# Patient Record
Sex: FEMALE | Race: WHITE | URBAN - METROPOLITAN AREA
[De-identification: names, ages, dates, MRNs, and addresses within clinical notes are randomized per-mention and may not be internally consistent; named-entity substitution may affect disease eponyms.]

---

## 2023-05-13 ENCOUNTER — NURSE ONLY (OUTPATIENT)
Dept: URGENT CARE | Age: 73
End: 2023-05-13

## 2023-05-13 VITALS
DIASTOLIC BLOOD PRESSURE: 87 MMHG | SYSTOLIC BLOOD PRESSURE: 147 MMHG | BODY MASS INDEX: 43.02 KG/M2 | RESPIRATION RATE: 17 BRPM | OXYGEN SATURATION: 92 % | HEIGHT: 64 IN | WEIGHT: 252 LBS | TEMPERATURE: 98.6 F | HEART RATE: 80 BPM

## 2023-05-13 DIAGNOSIS — J20.9 ACUTE BRONCHITIS, UNSPECIFIED ORGANISM: Primary | ICD-10-CM

## 2023-05-13 DIAGNOSIS — I10 ESSENTIAL HYPERTENSION: ICD-10-CM

## 2023-05-13 DIAGNOSIS — R05.1 ACUTE COUGH: ICD-10-CM

## 2023-05-13 LAB
Lab: NORMAL
PERFORMING INSTRUMENT: NORMAL
QC PASS/FAIL: NORMAL
SARS-COV-2, POC: NORMAL

## 2023-05-13 RX ORDER — DEXTROMETHORPHAN HYDROBROMIDE AND PROMETHAZINE HYDROCHLORIDE 15; 6.25 MG/5ML; MG/5ML
2.5 SYRUP ORAL NIGHTLY PRN
Qty: 30 ML | Refills: 0 | Status: SHIPPED | OUTPATIENT
Start: 2023-05-13

## 2023-05-13 RX ORDER — ATORVASTATIN CALCIUM 20 MG/1
20 TABLET, FILM COATED ORAL DAILY
COMMUNITY
Start: 2023-01-23

## 2023-05-13 RX ORDER — GUAIFENESIN 600 MG/1
600 TABLET, EXTENDED RELEASE ORAL 2 TIMES DAILY
Qty: 30 TABLET | Refills: 0 | Status: SHIPPED | OUTPATIENT
Start: 2023-05-13 | End: 2023-05-28

## 2023-05-13 RX ORDER — GARLIC EXTRACT 500 MG
1 CAPSULE ORAL DAILY
COMMUNITY
Start: 2017-03-17

## 2023-05-13 RX ORDER — MAGNESIUM OXIDE 400 MG/1
400 TABLET ORAL DAILY
COMMUNITY

## 2023-05-13 RX ORDER — LEVOTHYROXINE SODIUM 0.07 MG/1
TABLET ORAL
COMMUNITY
Start: 2023-05-09

## 2023-05-13 RX ORDER — METFORMIN HYDROCHLORIDE 500 MG/1
1 TABLET, EXTENDED RELEASE ORAL
COMMUNITY
Start: 2023-04-21

## 2023-05-13 RX ORDER — BENZONATATE 200 MG/1
200 CAPSULE ORAL 3 TIMES DAILY PRN
Qty: 60 CAPSULE | Refills: 0 | Status: SHIPPED | OUTPATIENT
Start: 2023-05-13 | End: 2023-06-02

## 2023-05-13 RX ORDER — UBIDECARENONE 30 MG
CAPSULE ORAL
COMMUNITY

## 2023-05-13 RX ORDER — METHYLPREDNISOLONE 4 MG/1
TABLET ORAL
Qty: 1 KIT | Refills: 0 | Status: SHIPPED | OUTPATIENT
Start: 2023-05-13 | End: 2023-05-19

## 2023-05-13 ASSESSMENT — ENCOUNTER SYMPTOMS
COUGH: 1
SHORTNESS OF BREATH: 1

## 2023-05-13 NOTE — PATIENT INSTRUCTIONS
Take medicaton as prescribed. Reviewed increasing water intake, sleeping in an elevated position to aid post nasal drip, using a cool mist humidifier,covering cough and proper hand hygiene. Go to Er for worsening shortness of breath  Negative for COVID19 today. Follow up with your primary care provider in 7 days if symptoms persist or if symptoms worsen. New Prescriptions    BENZONATATE (TESSALON) 200 MG CAPSULE    Take 1 capsule by mouth 3 times daily as needed for Cough    GUAIFENESIN (MUCINEX) 600 MG EXTENDED RELEASE TABLET    Take 1 tablet by mouth 2 times daily for 15 days    METHYLPREDNISOLONE (MEDROL DOSEPACK) 4 MG TABLET    Take by mouth.     PROMETHAZINE-DEXTROMETHORPHAN (PROMETHAZINE-DM) 6.25-15 MG/5ML SYRUP    Take 2.5 mLs by mouth nightly as needed for Cough

## 2023-05-13 NOTE — PROGRESS NOTES
Mathew Watts (:  1950) is a 68 y.o. female,New patient, here for evaluation of the following chief complaint(s):  Cough, Congestion, and Shortness of Breath (Coughing wheezing for the past couple months, two days of crackles and throat mucus )      ASSESSMENT/PLAN:    ICD-10-CM    1. Acute bronchitis, unspecified organism  J20.9 methylPREDNISolone (MEDROL DOSEPACK) 4 MG tablet     benzonatate (TESSALON) 200 MG capsule     promethazine-dextromethorphan (PROMETHAZINE-DM) 6.25-15 MG/5ML syrup     guaiFENesin (MUCINEX) 600 MG extended release tablet      2. Acute cough  R05.1 POCT COVID-19, Antigen     benzonatate (TESSALON) 200 MG capsule     promethazine-dextromethorphan (PROMETHAZINE-DM) 6.25-15 MG/5ML syrup     guaiFENesin (MUCINEX) 600 MG extended release tablet      3. Essential hypertension  I10            Take medicaton as prescribed. Reviewed increasing water intake, sleeping in an elevated position to aid post nasal drip, using a cool mist humidifier,covering cough and proper hand hygiene. Go to Er for worsening shortness of breath  Negative for COVID19 today. Follow up with your primary care provider in 7 days if symptoms persist or if symptoms worsen. SUBJECTIVE/OBJECTIVE:    History provided by:  Patient   used: No    HPI:   68 y.o. female presents with symptoms of wheezing with coughing ongoing since 1 month ago had some mucus now in the last 2 days. Denies fever. Had a positive COVID19 test at home but it was . Patient would like a confirmation test today. Vitals:    23 1134   BP: (!) 147/87   Pulse: 80   Resp: 17   Temp: 98.6 °F (37 °C)   SpO2: 92%   Weight: 252 lb (114.3 kg)   Height: 5' 4\" (1.626 m)       Review of Systems   HENT:  Positive for congestion. Respiratory:  Positive for cough and shortness of breath. All other systems reviewed and are negative. Physical Exam  Vitals and nursing note reviewed.    Constitutional:       Appearance:

## 2024-01-25 LAB
ALBUMIN SERPL-MCNC: 4 G/DL (ref 3.5–5.7)
ANION GAP SERPL CALCULATED.3IONS-SCNC: 5 MMOL/L (ref 4–16)
BUN BLDV-MCNC: 10 MG/DL (ref 8–26)
CALCIUM SERPL-MCNC: 8.7 MG/DL (ref 8.5–10.4)
CHLORIDE BLD-SCNC: 105 MEQ/L (ref 98–111)
CO2: 31 MMOL/L (ref 21–31)
CREAT SERPL-MCNC: 0.79 MG/DL (ref 0.6–1.2)
EGFR (CKD-EPI): 79 ML/MIN/1.73 M2
ESTIMATED AVERAGE GLUCOSE: 123 MG/DL
FRUCTOSAMINE: 225 MCMOL/L (ref 205–285)
GLUCOSE BLD-MCNC: 90 MG/DL (ref 70–99)
HBA1C MFR BLD: 5.9 % (ref 4.2–5.6)
HCT VFR BLD CALC: 42.8 % (ref 36–46)
HEMOGLOBIN: 14.3 G/DL (ref 12–15.2)
MCH RBC QN AUTO: 31.1 PG (ref 27–33)
MCHC RBC AUTO-ENTMCNC: 33.5 G/DL (ref 32–36)
MCV RBC AUTO: 93 FL (ref 82–97)
PDW BLD-RTO: 13.8 % (ref 12.3–17)
PHOSPHORUS: 2.8 MG/DL (ref 2.5–4.5)
PLATELET # BLD: 157 THOU/MCL (ref 140–375)
PMV BLD AUTO: 10.2 FL (ref 7.4–11.5)
POTASSIUM SERPL-SCNC: 3.9 MEQ/L (ref 3.6–5.1)
RBC # BLD: 4.6 MIL/MCL (ref 3.8–5.2)
SODIUM BLD-SCNC: 141 MEQ/L (ref 135–145)
WBC # BLD: 5.7 THOU/MCL (ref 3.6–10.5)

## 2024-02-14 LAB
ABO/RH: NORMAL
ANTIBODY SCREEN: NORMAL
EXPIRATION DATE: NORMAL
GLUCOSE BLD-MCNC: 106 MG/DL (ref 70–99)
GLUCOSE BLD-MCNC: 114 MG/DL (ref 70–99)

## 2024-02-15 LAB
GLUCOSE BLD-MCNC: 116 MG/DL (ref 70–99)
HCT VFR BLD CALC: 39.2 % (ref 36–46)
HEMOGLOBIN: 12.7 G/DL (ref 12–15.2)

## 2025-07-29 ENCOUNTER — OFFICE VISIT (OUTPATIENT)
Dept: URGENT CARE | Age: 75
End: 2025-07-29

## 2025-07-29 VITALS
TEMPERATURE: 98.2 F | HEART RATE: 78 BPM | OXYGEN SATURATION: 95 % | DIASTOLIC BLOOD PRESSURE: 66 MMHG | BODY MASS INDEX: 42.05 KG/M2 | SYSTOLIC BLOOD PRESSURE: 124 MMHG | WEIGHT: 245 LBS

## 2025-07-29 DIAGNOSIS — J40 BRONCHITIS: Primary | ICD-10-CM

## 2025-07-29 DIAGNOSIS — R09.81 NASAL CONGESTION: ICD-10-CM

## 2025-07-29 DIAGNOSIS — J34.89 RHINORRHEA: ICD-10-CM

## 2025-07-29 DIAGNOSIS — J06.9 VIRAL URI: ICD-10-CM

## 2025-07-29 DIAGNOSIS — R09.82 POSTNASAL DRIP: ICD-10-CM

## 2025-07-29 DIAGNOSIS — R05.1 ACUTE COUGH: ICD-10-CM

## 2025-07-29 PROBLEM — J95.2: Status: RESOLVED | Noted: 2025-07-29 | Resolved: 2025-07-29

## 2025-07-29 PROBLEM — E66.9 OBESITY: Status: ACTIVE | Noted: 2019-02-07

## 2025-07-29 PROBLEM — Z96.651 S/P TOTAL KNEE ARTHROPLASTY, RIGHT: Status: ACTIVE | Noted: 2024-02-15

## 2025-07-29 PROBLEM — H35.372 EPIRETINAL MEMBRANE (ERM) OF LEFT EYE: Status: ACTIVE | Noted: 2025-07-29

## 2025-07-29 PROBLEM — E11.9 TYPE 2 DIABETES MELLITUS TREATED WITHOUT INSULIN (HCC): Status: ACTIVE | Noted: 2025-07-29

## 2025-07-29 RX ORDER — BROMPHENIRAMINE MALEATE, PSEUDOEPHEDRINE HYDROCHLORIDE, AND DEXTROMETHORPHAN HYDROBROMIDE 2; 30; 10 MG/5ML; MG/5ML; MG/5ML
10 SYRUP ORAL 4 TIMES DAILY PRN
Qty: 200 ML | Refills: 1 | Status: SHIPPED | OUTPATIENT
Start: 2025-07-29

## 2025-07-29 RX ORDER — BENZONATATE 200 MG/1
200 CAPSULE ORAL 3 TIMES DAILY PRN
Qty: 30 CAPSULE | Refills: 0 | Status: SHIPPED | OUTPATIENT
Start: 2025-07-29 | End: 2025-08-08

## 2025-07-29 RX ORDER — GUAIFENESIN 600 MG/1
600 TABLET, EXTENDED RELEASE ORAL 2 TIMES DAILY
Qty: 20 TABLET | Refills: 0 | Status: SHIPPED | OUTPATIENT
Start: 2025-07-29 | End: 2025-08-08

## 2025-07-29 ASSESSMENT — ENCOUNTER SYMPTOMS
COUGH: 1
DIARRHEA: 0
VOMITING: 0
SHORTNESS OF BREATH: 0
SORE THROAT: 0
RHINORRHEA: 0
NAUSEA: 0
VOICE CHANGE: 1
APNEA: 0
ABDOMINAL PAIN: 0
STRIDOR: 0
WHEEZING: 1
CHEST TIGHTNESS: 1
SINUS PRESSURE: 0
SINUS PAIN: 0

## 2025-07-29 ASSESSMENT — VISUAL ACUITY: OU: 1

## 2025-07-29 NOTE — PATIENT INSTRUCTIONS
Aria,    Thank you for trusting [facility] with your care. Your decision to come to us means a lot and we are honored to be part of your healthcare journey. We value your trust and hope your experience with us was positive and met your expectations.    We're always looking for ways to improve, and your feedback is incredibly important to us. You will receive a text or email soon asking you how your visit went. for If you could take a moment to share your thoughts, it would mean the world to us. Your input helps us better serve you and others in the community.     Thank you again for choosing us. We're grateful for the opportunity to care for you and your loved ones. We hope to see you again - though we always wish you health and wellness!    Warm regards,    [facility]    Michael Batista (Physician Assistant), Iva (Registration), and Anna (Medical Assistant)        Bromfed prescribed for cough and congestion relief   Do not take other decongestants or cough medications while on this medication.  Guaifenesin (Mucinex) prescribed for expectorant therapy.  Benzonatate prescribed for cough relief.      Recommend OTC treatment for symptoms:  ibuprofen (Advil, Motrin) and acetaminophen (Tylenol) for fevers and pain relief.  decongestants (specifically pseudoephedrine - found behind the pharmacy counter - does not need a prescription) <avoid if you have a history of high blood pressure or heart conditions>, along with antihistamines (Claritin, Zyrtec, Allegra, or Xyzal) and nasal steroid sprays (such as Flonase) to help with nasal congestion and runny nose.  guaifenesin (Mucinex) can help with thinning out mucus which can help with chest congestion or with relieving persistent sinus pressure  throat sprays (Cepacol, chloraseptic) for throat pain.  dextromethorphan (Robitussin, Delsym), throat lozenges, and increased water intake for cough.  honey (1-2 teaspoons every hour) for

## 2025-07-29 NOTE — PROGRESS NOTES
Aria Lopez (: 1950) is a 75 y.o. female, Established patient, here for evaluation of the following chief complaint(s):  Cough (Cough, congestion, sx started 3 days ago )      ASSESSMENT/PLAN:    ICD-10-CM    1. Bronchitis  J40 benzonatate (TESSALON) 200 MG capsule     brompheniramine-pseudoephedrine-DM 2-30-10 MG/5ML syrup     guaiFENesin (MUCINEX) 600 MG extended release tablet      2. Viral URI  J06.9 benzonatate (TESSALON) 200 MG capsule     brompheniramine-pseudoephedrine-DM 2-30-10 MG/5ML syrup     guaiFENesin (MUCINEX) 600 MG extended release tablet      3. Nasal congestion  R09.81 brompheniramine-pseudoephedrine-DM 2-30-10 MG/5ML syrup     guaiFENesin (MUCINEX) 600 MG extended release tablet      4. Rhinorrhea  J34.89 brompheniramine-pseudoephedrine-DM 2-30-10 MG/5ML syrup     guaiFENesin (MUCINEX) 600 MG extended release tablet      5. Postnasal drip  R09.82 brompheniramine-pseudoephedrine-DM 2-30-10 MG/5ML syrup     guaiFENesin (MUCINEX) 600 MG extended release tablet      6. Acute cough  R05.1 benzonatate (TESSALON) 200 MG capsule     brompheniramine-pseudoephedrine-DM 2-30-10 MG/5ML syrup     guaiFENesin (MUCINEX) 600 MG extended release tablet          - Bronchitis:  Patient declines in-clinic COVID and Flu testing.  Given history of illness, symptoms of nasal congestion, chest congestion, throat drainage, cough, fatigue, wheezing, and chest tightness, and exam findings of congestion, postnasal drip with thickened mucus, pharyngeal erythema, and coarse upper airway sounds, there is concern for bronchitis complicating an underlying viral URI.  Low concern for bacterial etiology of symptoms given lack of purulent findings  Low concern for bacterial sinusitis, otitis media, Strep pharyngitis, respiratory distress, pneumonia, \and PE.  Bromfed is prescribed for cough and congestion relief.  Guaifenesin is prescribed for expectorant therapy.  Benzonatate is prescribed for cough

## 2025-08-07 ENCOUNTER — OFFICE VISIT (OUTPATIENT)
Dept: URGENT CARE | Age: 75
End: 2025-08-07

## 2025-08-07 VITALS
TEMPERATURE: 98.4 F | WEIGHT: 245 LBS | OXYGEN SATURATION: 95 % | SYSTOLIC BLOOD PRESSURE: 128 MMHG | DIASTOLIC BLOOD PRESSURE: 85 MMHG | BODY MASS INDEX: 42.05 KG/M2 | HEART RATE: 75 BPM

## 2025-08-07 DIAGNOSIS — R05.1 ACUTE COUGH: ICD-10-CM

## 2025-08-07 DIAGNOSIS — R09.81 NASAL CONGESTION: ICD-10-CM

## 2025-08-07 DIAGNOSIS — J01.10 ACUTE NON-RECURRENT FRONTAL SINUSITIS: Primary | ICD-10-CM

## 2025-08-07 RX ORDER — DEXTROMETHORPHAN POLISTIREX 30 MG/5ML
60 SUSPENSION ORAL 2 TIMES DAILY PRN
Qty: 120 ML | Refills: 0 | Status: SHIPPED | OUTPATIENT
Start: 2025-08-07

## 2025-08-07 RX ORDER — DOXYCYCLINE HYCLATE 100 MG
100 TABLET ORAL 2 TIMES DAILY
Qty: 14 TABLET | Refills: 0 | Status: SHIPPED | OUTPATIENT
Start: 2025-08-07 | End: 2025-08-14

## 2025-08-07 ASSESSMENT — ENCOUNTER SYMPTOMS
COUGH: 1
VOMITING: 0
NAUSEA: 0
DIARRHEA: 0
SHORTNESS OF BREATH: 0
SORE THROAT: 1
EYE DISCHARGE: 0
ABDOMINAL PAIN: 0
EYE REDNESS: 0
EYE PAIN: 0